# Patient Record
Sex: MALE | Race: WHITE | Employment: OTHER | URBAN - METROPOLITAN AREA
[De-identification: names, ages, dates, MRNs, and addresses within clinical notes are randomized per-mention and may not be internally consistent; named-entity substitution may affect disease eponyms.]

---

## 2023-10-02 ENCOUNTER — HOSPITAL ENCOUNTER (INPATIENT)
Age: 88
LOS: 3 days | Discharge: HOME | DRG: 381 | End: 2023-10-06
Attending: EMERGENCY MEDICINE | Admitting: INTERNAL MEDICINE
Payer: OTHER GOVERNMENT

## 2023-10-02 DIAGNOSIS — N18.31 STAGE 3A CHRONIC KIDNEY DISEASE (HCC): ICD-10-CM

## 2023-10-02 DIAGNOSIS — R13.10 DYSPHAGIA, UNSPECIFIED TYPE: Primary | ICD-10-CM

## 2023-10-02 DIAGNOSIS — R13.19 ESOPHAGEAL DYSPHAGIA: ICD-10-CM

## 2023-10-02 DIAGNOSIS — D64.9 CHRONIC ANEMIA: ICD-10-CM

## 2023-10-02 LAB
ALBUMIN SERPL-MCNC: 3.6 GM/DL (ref 3.4–5)
ALP BLD-CCNC: 88 IU/L (ref 40–128)
ALT SERPL-CCNC: 7 U/L (ref 10–40)
ANION GAP SERPL CALCULATED.3IONS-SCNC: 15 MMOL/L (ref 4–16)
AST SERPL-CCNC: 9 IU/L (ref 15–37)
BASOPHILS ABSOLUTE: 0.1 K/CU MM
BASOPHILS RELATIVE PERCENT: 0.9 % (ref 0–1)
BILIRUB SERPL-MCNC: 0.6 MG/DL (ref 0–1)
BUN SERPL-MCNC: 28 MG/DL (ref 6–23)
CALCIUM SERPL-MCNC: 8.8 MG/DL (ref 8.3–10.6)
CHLORIDE BLD-SCNC: 101 MMOL/L (ref 99–110)
CO2: 22 MMOL/L (ref 21–32)
CREAT SERPL-MCNC: 1.5 MG/DL (ref 0.9–1.3)
DIFFERENTIAL TYPE: ABNORMAL
EOSINOPHILS ABSOLUTE: 0.3 K/CU MM
EOSINOPHILS RELATIVE PERCENT: 3.4 % (ref 0–3)
GFR SERPL CREATININE-BSD FRML MDRD: 45 ML/MIN/1.73M2
GLUCOSE BLD-MCNC: 174 MG/DL (ref 70–99)
GLUCOSE SERPL-MCNC: 183 MG/DL (ref 70–99)
HCT VFR BLD CALC: 29.5 % (ref 42–52)
HEMOGLOBIN: 9.5 GM/DL (ref 13.5–18)
IMMATURE NEUTROPHIL %: 0.3 % (ref 0–0.43)
LYMPHOCYTES ABSOLUTE: 2.9 K/CU MM
LYMPHOCYTES RELATIVE PERCENT: 37.4 % (ref 24–44)
MCH RBC QN AUTO: 31.4 PG (ref 27–31)
MCHC RBC AUTO-ENTMCNC: 32.2 % (ref 32–36)
MCV RBC AUTO: 97.4 FL (ref 78–100)
MONOCYTES ABSOLUTE: 0.7 K/CU MM
MONOCYTES RELATIVE PERCENT: 8.6 % (ref 0–4)
NUCLEATED RBC %: 0 %
PDW BLD-RTO: 16.4 % (ref 11.7–14.9)
PLATELET # BLD: 228 K/CU MM (ref 140–440)
PMV BLD AUTO: 10.2 FL (ref 7.5–11.1)
POTASSIUM SERPL-SCNC: 4.4 MMOL/L (ref 3.5–5.1)
RBC # BLD: 3.03 M/CU MM (ref 4.6–6.2)
SEGMENTED NEUTROPHILS ABSOLUTE COUNT: 3.9 K/CU MM
SEGMENTED NEUTROPHILS RELATIVE PERCENT: 49.4 % (ref 36–66)
SODIUM BLD-SCNC: 138 MMOL/L (ref 135–145)
TOTAL IMMATURE NEUTOROPHIL: 0.02 K/CU MM
TOTAL NUCLEATED RBC: 0 K/CU MM
TOTAL PROTEIN: 6.7 GM/DL (ref 6.4–8.2)
WBC # BLD: 7.9 K/CU MM (ref 4–10.5)

## 2023-10-02 PROCEDURE — 99285 EMERGENCY DEPT VISIT HI MDM: CPT

## 2023-10-02 PROCEDURE — 93005 ELECTROCARDIOGRAM TRACING: CPT | Performed by: INTERNAL MEDICINE

## 2023-10-02 PROCEDURE — 82962 GLUCOSE BLOOD TEST: CPT

## 2023-10-02 PROCEDURE — 80053 COMPREHEN METABOLIC PANEL: CPT

## 2023-10-02 PROCEDURE — 85025 COMPLETE CBC W/AUTO DIFF WBC: CPT

## 2023-10-02 ASSESSMENT — PAIN - FUNCTIONAL ASSESSMENT: PAIN_FUNCTIONAL_ASSESSMENT: NONE - DENIES PAIN

## 2023-10-02 NOTE — ED PROVIDER NOTES
EKG:   -Atrial fibrillation  - Ventricular rate 78  - NV interval *  - QRS duration 68  - QT//403  - No STEMI  - No, Q-T prolongation, WPW, Q waves suggestive of HOCM, V-tach/V-fib, A-fib, A-flutter, SVT, torsardes or Brugada.      EKG interpreted by me in the absence of a cardiologist       Gemma, [address], DO  10/06/23 132

## 2023-10-03 ENCOUNTER — ANESTHESIA EVENT (OUTPATIENT)
Dept: ENDOSCOPY | Age: 88
End: 2023-10-03
Payer: OTHER GOVERNMENT

## 2023-10-03 ENCOUNTER — APPOINTMENT (OUTPATIENT)
Dept: CT IMAGING | Age: 88
DRG: 381 | End: 2023-10-03
Payer: OTHER GOVERNMENT

## 2023-10-03 PROBLEM — R13.10 DYSPHAGIA: Status: ACTIVE | Noted: 2023-10-03

## 2023-10-03 LAB
GLUCOSE BLD-MCNC: 127 MG/DL (ref 70–99)
GLUCOSE BLD-MCNC: 131 MG/DL (ref 70–99)
GLUCOSE BLD-MCNC: 133 MG/DL (ref 70–99)
GLUCOSE BLD-MCNC: 138 MG/DL (ref 70–99)

## 2023-10-03 PROCEDURE — 2580000003 HC RX 258: Performed by: FAMILY MEDICINE

## 2023-10-03 PROCEDURE — 6360000002 HC RX W HCPCS: Performed by: EMERGENCY MEDICINE

## 2023-10-03 PROCEDURE — 6360000002 HC RX W HCPCS: Performed by: INTERNAL MEDICINE

## 2023-10-03 PROCEDURE — A4216 STERILE WATER/SALINE, 10 ML: HCPCS | Performed by: LICENSED PRACTICAL NURSE

## 2023-10-03 PROCEDURE — C9113 INJ PANTOPRAZOLE SODIUM, VIA: HCPCS | Performed by: EMERGENCY MEDICINE

## 2023-10-03 PROCEDURE — 71250 CT THORAX DX C-: CPT

## 2023-10-03 PROCEDURE — C9113 INJ PANTOPRAZOLE SODIUM, VIA: HCPCS | Performed by: LICENSED PRACTICAL NURSE

## 2023-10-03 PROCEDURE — 6360000002 HC RX W HCPCS: Performed by: LICENSED PRACTICAL NURSE

## 2023-10-03 PROCEDURE — 94761 N-INVAS EAR/PLS OXIMETRY MLT: CPT

## 2023-10-03 PROCEDURE — 2580000003 HC RX 258: Performed by: INTERNAL MEDICINE

## 2023-10-03 PROCEDURE — 1200000000 HC SEMI PRIVATE

## 2023-10-03 PROCEDURE — 82962 GLUCOSE BLOOD TEST: CPT

## 2023-10-03 PROCEDURE — 96374 THER/PROPH/DIAG INJ IV PUSH: CPT

## 2023-10-03 PROCEDURE — 93005 ELECTROCARDIOGRAM TRACING: CPT | Performed by: INTERNAL MEDICINE

## 2023-10-03 PROCEDURE — 2580000003 HC RX 258: Performed by: LICENSED PRACTICAL NURSE

## 2023-10-03 RX ORDER — SODIUM CHLORIDE 0.9 % (FLUSH) 0.9 %
5-40 SYRINGE (ML) INJECTION EVERY 12 HOURS SCHEDULED
Status: DISCONTINUED | OUTPATIENT
Start: 2023-10-03 | End: 2023-10-06 | Stop reason: HOSPADM

## 2023-10-03 RX ORDER — INSULIN LISPRO 100 [IU]/ML
0-4 INJECTION, SOLUTION INTRAVENOUS; SUBCUTANEOUS NIGHTLY
Status: DISCONTINUED | OUTPATIENT
Start: 2023-10-03 | End: 2023-10-06 | Stop reason: HOSPADM

## 2023-10-03 RX ORDER — ENOXAPARIN SODIUM 100 MG/ML
40 INJECTION SUBCUTANEOUS DAILY
Status: DISCONTINUED | OUTPATIENT
Start: 2023-10-03 | End: 2023-10-06 | Stop reason: HOSPADM

## 2023-10-03 RX ORDER — SODIUM CHLORIDE 9 MG/ML
INJECTION, SOLUTION INTRAVENOUS PRN
Status: DISCONTINUED | OUTPATIENT
Start: 2023-10-03 | End: 2023-10-06 | Stop reason: HOSPADM

## 2023-10-03 RX ORDER — ACETAMINOPHEN 650 MG/1
650 SUPPOSITORY RECTAL EVERY 6 HOURS PRN
Status: DISCONTINUED | OUTPATIENT
Start: 2023-10-03 | End: 2023-10-06 | Stop reason: HOSPADM

## 2023-10-03 RX ORDER — POLYETHYLENE GLYCOL 3350 17 G/17G
17 POWDER, FOR SOLUTION ORAL DAILY PRN
Status: DISCONTINUED | OUTPATIENT
Start: 2023-10-03 | End: 2023-10-06 | Stop reason: HOSPADM

## 2023-10-03 RX ORDER — INSULIN LISPRO 100 [IU]/ML
0-8 INJECTION, SOLUTION INTRAVENOUS; SUBCUTANEOUS
Status: DISCONTINUED | OUTPATIENT
Start: 2023-10-03 | End: 2023-10-06 | Stop reason: HOSPADM

## 2023-10-03 RX ORDER — ACETAMINOPHEN 325 MG/1
650 TABLET ORAL EVERY 6 HOURS PRN
Status: DISCONTINUED | OUTPATIENT
Start: 2023-10-03 | End: 2023-10-06 | Stop reason: HOSPADM

## 2023-10-03 RX ORDER — SODIUM CHLORIDE 0.9 % (FLUSH) 0.9 %
5-40 SYRINGE (ML) INJECTION PRN
Status: DISCONTINUED | OUTPATIENT
Start: 2023-10-03 | End: 2023-10-06 | Stop reason: HOSPADM

## 2023-10-03 RX ORDER — SODIUM CHLORIDE 9 MG/ML
INJECTION, SOLUTION INTRAVENOUS CONTINUOUS
Status: DISPENSED | OUTPATIENT
Start: 2023-10-03 | End: 2023-10-03

## 2023-10-03 RX ORDER — PANTOPRAZOLE SODIUM 40 MG/10ML
40 INJECTION, POWDER, LYOPHILIZED, FOR SOLUTION INTRAVENOUS ONCE
Status: COMPLETED | OUTPATIENT
Start: 2023-10-03 | End: 2023-10-03

## 2023-10-03 RX ORDER — ONDANSETRON 4 MG/1
4 TABLET, ORALLY DISINTEGRATING ORAL EVERY 8 HOURS PRN
Status: DISCONTINUED | OUTPATIENT
Start: 2023-10-03 | End: 2023-10-06 | Stop reason: HOSPADM

## 2023-10-03 RX ORDER — DEXTROSE MONOHYDRATE 100 MG/ML
INJECTION, SOLUTION INTRAVENOUS CONTINUOUS PRN
Status: DISCONTINUED | OUTPATIENT
Start: 2023-10-03 | End: 2023-10-06 | Stop reason: HOSPADM

## 2023-10-03 RX ORDER — GLUCAGON 1 MG/ML
1 KIT INJECTION PRN
Status: DISCONTINUED | OUTPATIENT
Start: 2023-10-03 | End: 2023-10-06 | Stop reason: HOSPADM

## 2023-10-03 RX ORDER — SODIUM CHLORIDE 9 MG/ML
INJECTION, SOLUTION INTRAVENOUS CONTINUOUS
Status: DISPENSED | OUTPATIENT
Start: 2023-10-03 | End: 2023-10-04

## 2023-10-03 RX ORDER — ONDANSETRON 2 MG/ML
4 INJECTION INTRAMUSCULAR; INTRAVENOUS EVERY 6 HOURS PRN
Status: DISCONTINUED | OUTPATIENT
Start: 2023-10-03 | End: 2023-10-06 | Stop reason: HOSPADM

## 2023-10-03 RX ADMIN — PANTOPRAZOLE SODIUM 40 MG: 40 INJECTION, POWDER, FOR SOLUTION INTRAVENOUS at 02:36

## 2023-10-03 RX ADMIN — SODIUM CHLORIDE, PRESERVATIVE FREE 5 ML: 5 INJECTION INTRAVENOUS at 20:33

## 2023-10-03 RX ADMIN — PANTOPRAZOLE SODIUM 40 MG: 40 INJECTION, POWDER, FOR SOLUTION INTRAVENOUS at 17:59

## 2023-10-03 RX ADMIN — SODIUM CHLORIDE, PRESERVATIVE FREE 10 ML: 5 INJECTION INTRAVENOUS at 09:02

## 2023-10-03 RX ADMIN — ENOXAPARIN SODIUM 40 MG: 100 INJECTION SUBCUTANEOUS at 09:02

## 2023-10-03 RX ADMIN — SODIUM CHLORIDE: 9 INJECTION, SOLUTION INTRAVENOUS at 22:08

## 2023-10-03 NOTE — ED NOTES
ED TO INPATIENT SBAR HANDOFF    Patient Name: Hugo Erwin   :  1935  82 y.o. Preferred Name  Goldy Gusman  Family/Caregiver Present no   Restraints no   C-SSRS: Risk of Suicide: No Risk  Sitter no   Sepsis Risk Score Sepsis Risk Score: 0.9      Situation  Chief Complaint   Patient presents with    Dysphagia     Pt states has been having trouble swallowing for 3 months. Weight Loss     Brief Description of Patient's Condition: Patient came into the ER today for weight loss and has been having trouble swallowing. Awaiting for GI to possibly do a scope. Mental Status: oriented, alert, and coherent  Arrived from: home    Imaging:   CT CHEST WO CONTRAST    (Results Pending)     Abnormal labs:   Abnormal Labs Reviewed   CBC WITH AUTO DIFFERENTIAL - Abnormal; Notable for the following components:       Result Value    RBC 3.03 (*)     Hemoglobin 9.5 (*)     Hematocrit 29.5 (*)     MCH 31.4 (*)     RDW 16.4 (*)     Monocytes % 8.6 (*)     Eosinophils % 3.4 (*)     All other components within normal limits   COMPREHENSIVE METABOLIC PANEL W/ REFLEX TO MG FOR LOW K - Abnormal; Notable for the following components:    Glucose 183 (*)     BUN 28 (*)     Creatinine 1.5 (*)     Est, Glom Filt Rate 45 (*)     ALT 7 (*)     AST 9 (*)     All other components within normal limits   POCT GLUCOSE - Abnormal; Notable for the following components:    POC Glucose 174 (*)     All other components within normal limits   POCT GLUCOSE - Abnormal; Notable for the following components:    POC Glucose 138 (*)     All other components within normal limits   POCT GLUCOSE - Abnormal; Notable for the following components:    POC Glucose 127 (*)     All other components within normal limits   POCT GLUCOSE - Abnormal; Notable for the following components:    POC Glucose 133 (*)     All other components within normal limits       Background  History: No past medical history on file.     Assessment    Vitals/MEWS:    Level of Consciousness:

## 2023-10-03 NOTE — ED NOTES
[id number] perfect serve message sent to Dr Solo Warner on in patient consult from hospitalist.    [id number] Dr Solo Warner acknowledged perfect serve message.  Added to treatment team     Leonardo Moreau  10/03/23 3738

## 2023-10-03 NOTE — ED PROVIDER NOTES
[address] ENCOUNTER      Pt Name: Bam Blanca  MRN: [id number]  [facility] 1935  Date of evaluation: 10/2/2023  Provider: Cailin Breaux MD    CHIEF COMPLAINT       Chief Complaint   Patient presents with    Dysphagia     Pt states has been having trouble swallowing for 3 months. Weight Loss         HISTORY OF PRESENT ILLNESS      Bam Blanca is a 82 y.o. male who presents to the emergency department  for   Chief Complaint   Patient presents with    Dysphagia     Pt states has been having trouble swallowing for 3 months. Weight Loss       53-year-old male presents complaining of throat pain, dysphagia as well as weight loss. He is recently moved to [address] [place] [address]. He is a bit of a difficult historian. I was able to obtain collateral information from his daughter by phone. He reports that he was in [place] in July and developed respiratory failure and was hospitalized for a prolonged period. According to his daughter, it was believed that he had been aspirating. She reports that he initially had very high oxygen requirements and was on hospice for a while. He had some extensive work-up including some GI work-up in [place] she reports that showed some erosive gastritis as well as esophageal disease. He is on Protonix. She reports that his respiratory status did improve over time and that he has moved to [address] [place] [address] because it is at lower elevation. At this time he only occasionally requires supplemental oxygen. According to the patient as well as his daughter, he is having difficulty particularly with solid foods. He reports that food gets stuck for hours. He can successfully eat liquid or very soft foods. He endorses a weight loss of about 45 pounds recently. He is not having any breathing difficulties. He denies any fever, chills or other constitutional infectious symptoms. No abdominal pain, nausea or vomiting.

## 2023-10-03 NOTE — H&P
History and Physical      Name:  Amelia Reid /Age/Sex: 1935  (82 y.o. male)   MRN & CSN:  [id number] & [id number] Encounter Date/Time: 10/3/2023 2:47 AM EDT   Location:  ED30/ED-30 PCP: No primary care provider on file. Hospital Day: 2    Assessment and Plan:     #. Dysphagia  -Patient reports progressive dysphagia to solid, currently to soft foods. Patient can tolerate liquid diet.  -Keep patient n.p.o.  -GI consulted from ED  -Aspiration precautions. #.  Diabetes mellitus type 2, on long-term insulin  -As per care everywhere patient is on aspart, Lantus  -A1c-7.7-2023  -Patient reports that he usually takes sliding scale, not clear about Lantus  -Continue insulin sliding scale with hypoglycemia protocol. #.  CKD stage III  -Renal function at baseline.  -Creatinine 1.    #. Chronic anemia  -Hemoglobin 9.5 today, was 10.1-2023  -Monitor with repeat H&H    #. Chronic respiratory failure on home oxygen 2.5 L as needed. Disposition:   Current Living situation: home    Diet Keep n.p.o.   DVT Prophylaxis [x] Lovenox, []  Heparin, [] SCDs, [] Ambulation,  [] Eliquis, [] Xarelto   Code Status FULL   Surrogate Decision Maker/ POA      History from:   EMR, patient. History of Present Illness:     Chief Complaint: Dysphagia  Amelia Reid is a 82 y.o. male with diabetes mellitus type 2, CKD stage III, chronic anemia presented to ED for complaints of difficulty swallowing. Patient reports that he has been having difficulty swallowing for few months, which is progressively getting worse. Patient also reports losing 45 pounds in the last 3 months. Patient states that he is actually from [place], was with his daughter and [address] where she was working. They recently moved to [address]. No family is at bedside. ED physician discussed with patient's daughter.   Patient denying any chest pain, shortness of breath, denied any abdominal pain, denied any urinary

## 2023-10-03 NOTE — CONSULTS
[facility]             Evia Hooks, MD Jerral Manifold, MD Valentino Pedlar, APRN-CNP       Radha Clarke, APRN-CNP             [address] [facility] [address] [facility], [address] [facility]             [phone] fax [phone]         Reason for Consult:  Dysphagia    HISTORY OF PRESENT ILLNESS:                The patient is a 82 y.o. male with a past medical history S/P R knee replacement and a long hospital discourse prior to July 2023 in [address] [place] [address] where he had respiratory failure and required 16L of oxygen. There was an extensive GI workup done at that time. Was originally placed on hospice per family request. Resp status improved and he moved back to [place]. Per patient recently traveled from [address] [place] [address] and symptoms have been progressively worse. Was evaluated by a local hospital there and was discharged and treated for ulcers. Went to a second hospital during this trip and was discharged again for same thing. Has been home for 4 days. Patient reports when he was out to eat  2 days ago when he was eating some chicken he felt like it got stuck. He says that he is able to drink fluids but not solids. He reports having at least 6 of these episodes in the past. He reports for example vegetable soup the broth is fine but the vegetables are not. Reports weight loss of 45 pounds since June 9th. Denies having had difficulty swallowing this long. However, per daughter Bulmaro Hebert he was having swallowing difficulties during the long admission prior to July. Per daughter he had a swallow evaluation with imaging and EGD. Unable to see via care everwhere. Patient denies current abdominal pain, reflux, heartburn, Diarrhea, and constipation. Denies hematochezia, hematemesis, and melena stools He is positive for dysphagia, and vomiting. ROS: Per history of present illness. All other systems were reviewed and were negative. Allergies:  No Known Allergies.     Medications:

## 2023-10-04 ENCOUNTER — ANESTHESIA (OUTPATIENT)
Dept: ENDOSCOPY | Age: 88
End: 2023-10-04
Payer: OTHER GOVERNMENT

## 2023-10-04 PROBLEM — E43 SEVERE MALNUTRITION (HCC): Status: ACTIVE | Noted: 2023-10-04

## 2023-10-04 LAB
ANION GAP SERPL CALCULATED.3IONS-SCNC: 13 MMOL/L (ref 4–16)
BASOPHILS ABSOLUTE: 0.1 K/CU MM
BASOPHILS RELATIVE PERCENT: 0.9 % (ref 0–1)
BUN SERPL-MCNC: 23 MG/DL (ref 6–23)
CALCIUM SERPL-MCNC: 8.6 MG/DL (ref 8.3–10.6)
CHLORIDE BLD-SCNC: 105 MMOL/L (ref 99–110)
CO2: 23 MMOL/L (ref 21–32)
CREAT SERPL-MCNC: 1.4 MG/DL (ref 0.9–1.3)
DIFFERENTIAL TYPE: ABNORMAL
EKG ATRIAL RATE: 67 BPM
EKG ATRIAL RATE: 84 BPM
EKG DIAGNOSIS: NORMAL
EKG DIAGNOSIS: NORMAL
EKG P AXIS: 33 DEGREES
EKG P-R INTERVAL: 154 MS
EKG Q-T INTERVAL: 354 MS
EKG Q-T INTERVAL: 424 MS
EKG QRS DURATION: 68 MS
EKG QRS DURATION: 84 MS
EKG QTC CALCULATION (BAZETT): 403 MS
EKG QTC CALCULATION (BAZETT): 448 MS
EKG R AXIS: -14 DEGREES
EKG R AXIS: -18 DEGREES
EKG T AXIS: 0 DEGREES
EKG T AXIS: 9 DEGREES
EKG VENTRICULAR RATE: 67 BPM
EKG VENTRICULAR RATE: 78 BPM
EOSINOPHILS ABSOLUTE: 0.2 K/CU MM
EOSINOPHILS RELATIVE PERCENT: 3.6 % (ref 0–3)
GFR SERPL CREATININE-BSD FRML MDRD: 49 ML/MIN/1.73M2
GLUCOSE BLD-MCNC: 100 MG/DL (ref 70–99)
GLUCOSE BLD-MCNC: 104 MG/DL (ref 70–99)
GLUCOSE BLD-MCNC: 108 MG/DL (ref 70–99)
GLUCOSE BLD-MCNC: 223 MG/DL (ref 70–99)
GLUCOSE SERPL-MCNC: 109 MG/DL (ref 70–99)
HCT VFR BLD CALC: 29.8 % (ref 42–52)
HEMOGLOBIN: 9.5 GM/DL (ref 13.5–18)
IMMATURE NEUTROPHIL %: 0.3 % (ref 0–0.43)
LYMPHOCYTES ABSOLUTE: 2.2 K/CU MM
LYMPHOCYTES RELATIVE PERCENT: 34.3 % (ref 24–44)
MCH RBC QN AUTO: 31 PG (ref 27–31)
MCHC RBC AUTO-ENTMCNC: 31.9 % (ref 32–36)
MCV RBC AUTO: 97.4 FL (ref 78–100)
MONOCYTES ABSOLUTE: 0.5 K/CU MM
MONOCYTES RELATIVE PERCENT: 8.3 % (ref 0–4)
NUCLEATED RBC %: 0 %
PDW BLD-RTO: 15.9 % (ref 11.7–14.9)
PLATELET # BLD: 219 K/CU MM (ref 140–440)
PMV BLD AUTO: 10 FL (ref 7.5–11.1)
POTASSIUM SERPL-SCNC: 4.4 MMOL/L (ref 3.5–5.1)
RBC # BLD: 3.06 M/CU MM (ref 4.6–6.2)
SEGMENTED NEUTROPHILS ABSOLUTE COUNT: 3.3 K/CU MM
SEGMENTED NEUTROPHILS RELATIVE PERCENT: 52.6 % (ref 36–66)
SODIUM BLD-SCNC: 141 MMOL/L (ref 135–145)
TOTAL IMMATURE NEUTOROPHIL: 0.02 K/CU MM
TOTAL NUCLEATED RBC: 0 K/CU MM
WBC # BLD: 6.4 K/CU MM (ref 4–10.5)

## 2023-10-04 PROCEDURE — A4216 STERILE WATER/SALINE, 10 ML: HCPCS | Performed by: LICENSED PRACTICAL NURSE

## 2023-10-04 PROCEDURE — 94761 N-INVAS EAR/PLS OXIMETRY MLT: CPT

## 2023-10-04 PROCEDURE — 6360000002 HC RX W HCPCS: Performed by: NURSE ANESTHETIST, CERTIFIED REGISTERED

## 2023-10-04 PROCEDURE — 82962 GLUCOSE BLOOD TEST: CPT

## 2023-10-04 PROCEDURE — 0D728ZZ DILATION OF MIDDLE ESOPHAGUS, VIA NATURAL OR ARTIFICIAL OPENING ENDOSCOPIC: ICD-10-PCS | Performed by: INTERNAL MEDICINE

## 2023-10-04 PROCEDURE — 88305 TISSUE EXAM BY PATHOLOGIST: CPT | Performed by: PATHOLOGY

## 2023-10-04 PROCEDURE — 2580000003 HC RX 258: Performed by: LICENSED PRACTICAL NURSE

## 2023-10-04 PROCEDURE — 93010 ELECTROCARDIOGRAM REPORT: CPT | Performed by: INTERNAL MEDICINE

## 2023-10-04 PROCEDURE — C1726 CATH, BAL DIL, NON-VASCULAR: HCPCS | Performed by: INTERNAL MEDICINE

## 2023-10-04 PROCEDURE — 6360000002 HC RX W HCPCS: Performed by: INTERNAL MEDICINE

## 2023-10-04 PROCEDURE — 6360000002 HC RX W HCPCS: Performed by: LICENSED PRACTICAL NURSE

## 2023-10-04 PROCEDURE — 3700000000 HC ANESTHESIA ATTENDED CARE: Performed by: INTERNAL MEDICINE

## 2023-10-04 PROCEDURE — 2500000003 HC RX 250 WO HCPCS: Performed by: NURSE ANESTHETIST, CERTIFIED REGISTERED

## 2023-10-04 PROCEDURE — 80048 BASIC METABOLIC PNL TOTAL CA: CPT

## 2023-10-04 PROCEDURE — 3700000001 HC ADD 15 MINUTES (ANESTHESIA): Performed by: INTERNAL MEDICINE

## 2023-10-04 PROCEDURE — 3609017700 HC EGD DILATION GASTRIC/DUODENAL STRICTURE: Performed by: INTERNAL MEDICINE

## 2023-10-04 PROCEDURE — C9113 INJ PANTOPRAZOLE SODIUM, VIA: HCPCS | Performed by: LICENSED PRACTICAL NURSE

## 2023-10-04 PROCEDURE — 85025 COMPLETE CBC W/AUTO DIFF WBC: CPT

## 2023-10-04 PROCEDURE — 36415 COLL VENOUS BLD VENIPUNCTURE: CPT

## 2023-10-04 PROCEDURE — 1200000000 HC SEMI PRIVATE

## 2023-10-04 PROCEDURE — 2580000003 HC RX 258: Performed by: INTERNAL MEDICINE

## 2023-10-04 PROCEDURE — 0DB28ZX EXCISION OF MIDDLE ESOPHAGUS, VIA NATURAL OR ARTIFICIAL OPENING ENDOSCOPIC, DIAGNOSTIC: ICD-10-PCS | Performed by: INTERNAL MEDICINE

## 2023-10-04 PROCEDURE — 2709999900 HC NON-CHARGEABLE SUPPLY: Performed by: INTERNAL MEDICINE

## 2023-10-04 RX ORDER — LIDOCAINE HYDROCHLORIDE 20 MG/ML
INJECTION, SOLUTION EPIDURAL; INFILTRATION; INTRACAUDAL; PERINEURAL PRN
Status: DISCONTINUED | OUTPATIENT
Start: 2023-10-04 | End: 2023-10-04 | Stop reason: SDUPTHER

## 2023-10-04 RX ORDER — PROPOFOL 10 MG/ML
INJECTION, EMULSION INTRAVENOUS PRN
Status: DISCONTINUED | OUTPATIENT
Start: 2023-10-04 | End: 2023-10-04 | Stop reason: SDUPTHER

## 2023-10-04 RX ADMIN — LIDOCAINE HYDROCHLORIDE 5 ML: 20 INJECTION, SOLUTION EPIDURAL; INFILTRATION; INTRACAUDAL; PERINEURAL at 14:37

## 2023-10-04 RX ADMIN — PANTOPRAZOLE SODIUM 40 MG: 40 INJECTION, POWDER, FOR SOLUTION INTRAVENOUS at 09:09

## 2023-10-04 RX ADMIN — PANTOPRAZOLE SODIUM 40 MG: 40 INJECTION, POWDER, FOR SOLUTION INTRAVENOUS at 17:28

## 2023-10-04 RX ADMIN — PROPOFOL 25 MG: 10 INJECTION, EMULSION INTRAVENOUS at 14:45

## 2023-10-04 RX ADMIN — ONDANSETRON 4 MG: 2 INJECTION INTRAMUSCULAR; INTRAVENOUS at 23:43

## 2023-10-04 RX ADMIN — SODIUM CHLORIDE, PRESERVATIVE FREE 5 ML: 5 INJECTION INTRAVENOUS at 20:29

## 2023-10-04 RX ADMIN — LIDOCAINE HYDROCHLORIDE 5 ML: 20 INJECTION, SOLUTION EPIDURAL; INFILTRATION; INTRACAUDAL; PERINEURAL at 14:39

## 2023-10-04 RX ADMIN — PROPOFOL 25 MG: 10 INJECTION, EMULSION INTRAVENOUS at 14:37

## 2023-10-04 ASSESSMENT — PAIN - FUNCTIONAL ASSESSMENT: PAIN_FUNCTIONAL_ASSESSMENT: NONE - DENIES PAIN

## 2023-10-04 NOTE — PROGRESS NOTES
CHEST WO CONTRAST    Result Date: 10/3/2023  EXAMINATION: CT OF THE CHEST WITHOUT CONTRAST 10/3/2023 6:26 pm TECHNIQUE: CT of the chest was performed without the administration of intravenous contrast. Multiplanar reformatted images are provided for review. Automated exposure control, iterative reconstruction, and/or weight based adjustment of the mA/kV was utilized to reduce the radiation dose to as low as reasonably achievable. COMPARISON: None. HISTORY: ORDERING SYSTEM PROVIDED HISTORY: Dysphagia, rule out GE junction mass, esophageal neoplasm TECHNOLOGIST PROVIDED HISTORY: Reason for exam:->Dysphagia, rule out GE junction mass, esophageal neoplasm Reason for Exam: Dysphagia, rule out GE junction mass, esophageal neoplasm FINDINGS: Mediastinum: Nonspecific subcentimeter mediastinal lymph nodes. No significant pericardial fluid. Calcified coronary atheromatous plaque. The esophagus is decompressed. No inflammatory change identified. Lungs/pleura: Subpleural reticular opacities and mild bronchiolectasis in the lung bases. No consolidation. No evidence for edema. The central airway is patent. Upper Abdomen: No acute findings. Cholelithiasis. Soft Tissues/Bones: No acute findings. 1.  No esophageal obstructing process or acute inflammatory change. Recommend endoscopy if there remains suspicion for underlying neoplasm. 2.  Nonspecific fibrotic changes in the lung bases. 3.  Cholelithiasis.        CBC:   Recent Labs     10/02/23  1700 10/04/23  0748   WBC 7.9 6.4   HGB 9.5* 9.5*    219     BMP:    Recent Labs     10/02/23  1700 10/04/23  0748    141   K 4.4 4.4    105   CO2 22 23   BUN 28* 23   CREATININE 1.5* 1.4*   GLUCOSE 183* 109*     Hepatic:   Recent Labs     10/02/23  1700   AST 9*   ALT 7*   BILITOT 0.6   ALKPHOS 88     Lipids: No results found for: \"CHOL\", \"HDL\", \"TRIG\"  Hemoglobin A1C: No results found for: \"LABA1C\"  TSH: No results found for: \"TSH\"  Troponin: No results found

## 2023-10-04 NOTE — CONSULTS
CARDIOLOGY CONSULT NOTE   Reason for consultation:  preop    Referring physician:  Sarah Dominguez MD     Primary care physician: No primary care provider on file. Dear   Thanks for the consult. History of present illness:Stiven is a 82 y. o.year old who  presents with dysphagia and weight loss, he is a very poor historian all information obtained after review medical record and discussion with staff patient is scheduled for a EGD and cardiac consult is called for preop evaluation  Chief Complaint   Patient presents with    Dysphagia     Pt states has been having trouble swallowing for 3 months. Weight Loss     Blood pressure, cholesterol, blood glucose and weight are well controlled. Past medical history:    has no past medical history on file. Past surgical history:   has no past surgical history on file.   Social History:     Family history:   no family history of CAD, STROKE of DM    No Known Allergies    sodium chloride flush 0.9 % injection 5-40 mL, 2 times per day  sodium chloride flush 0.9 % injection 5-40 mL, PRN  0.9 % sodium chloride infusion, PRN  enoxaparin (LOVENOX) injection 40 mg, Daily  ondansetron (ZOFRAN-ODT) disintegrating tablet 4 mg, Q8H PRN   Or  ondansetron (ZOFRAN) injection 4 mg, Q6H PRN  polyethylene glycol (GLYCOLAX) packet 17 g, Daily PRN  acetaminophen (TYLENOL) tablet 650 mg, Q6H PRN   Or  acetaminophen (TYLENOL) suppository 650 mg, Q6H PRN  insulin lispro (HUMALOG) injection vial 0-8 Units, TID WC  insulin lispro (HUMALOG) injection vial 0-4 Units, Nightly  glucose chewable tablet 16 g, PRN  dextrose bolus 10% 125 mL, PRN   Or  dextrose bolus 10% 250 mL, PRN  glucagon injection 1 mg, PRN  dextrose 10 % infusion, Continuous PRN  pantoprazole (PROTONIX) 40 mg in sodium chloride (PF) 0.9 % 10 mL injection, BID      Current Facility-Administered Medications   Medication Dose Route Frequency Provider Last Rate Last Admin    sodium chloride flush 0.9 % injection 5-40 mL

## 2023-10-04 NOTE — PROGRESS NOTES
Comprehensive Nutrition Assessment    Type and Reason for Visit:  Initial, Positive Nutrition Screen (Unintentional Weight Loss and Poor Appetite)    Nutrition Recommendations/Plan:   Consult SLP for diet modification   Trial variety of high protein oral nutrition supplements during admission  May consider GI [place] diet if appropriate   Please document all PO intakes in I/O   Please obtain measured weights daily      Malnutrition Assessment:  Malnutrition Status:  Severe malnutrition (10/04/23 1368)    Context:  Acute Illness     Findings of the 6 clinical characteristics of malnutrition:  Energy Intake:  75% or less of estimated energy requirements for 7 or more days (3 months)  Weight Loss:  Unable to assess (reported 40# wt loss/3 mo)     Body Fat Loss:   (severe fat loss) Triceps, Orbital   Muscle Mass Loss: Moderate muscle mass loss Calf (gastrocnemius), Thigh (quadraceps), Scapula (trapezius)  Fluid Accumulation:  No significant fluid accumulation Extremities   Strength:  Not Performed    Nutrition Assessment:    Admitted with dysphagia, hx CKD, HLD, HTN, DMII, Esophageal Reflux, Sciatica per [facility]. Pt currently NPO for EGD at visit. Pt's last good meal on Saturday 10/1 per dtr who was on the phone. Spoke with pt and pt's dtr Francisca (over the phone). Pt's dtr provided most of diet recall/history. Dtr reports pt with dysphagia after 1 week from admission for respiratory failure on July 8th, has been losing weight since July. Reports 40# wt loss/3 months. Limited data in chart to verify. Pt's diet consistent of applesauce, yogurt, eggs, and on a good day will tolerate carlson crisp. States tolerates thin liquids. Pt drinks 1 protein shake daily (Mass Fit powder). Pt's dtr reports concerns with pt's hx of duodenal ulcers, esophagitis, and impaired epiglottis function per [facility] S[facility]. Recommend SLP evaluation prior to diet advancement. Performed NFPE, moderate to severe wasting noted.  Meets criteria for acute

## 2023-10-04 NOTE — PROGRESS NOTES
4 Eyes Skin Assessment     NAME:  Glenn Anthony  YOB: 1935  MEDICAL RECORD NUMBER:  [id number]    The patient is being assessed for  Admission    I agree that at least one RN has performed a thorough Head to Toe Skin Assessment on the patient. ALL assessment sites listed below have been assessed. Areas assessed by both nurses:    Head, Face, Ears, Shoulders, Back, Chest, Arms, Elbows, Hands, Sacrum. Buttock, Coccyx, Ischium, Legs. Feet and Heels, and Under Medical Devices          Does the Patient have a Wound?  No noted wound(s)       Gus Prevention initiated by RN: Yes  Wound Care Orders initiated by RN: No    Pressure Injury (Stage 3,4, Unstageable, DTI, NWPT, and Complex wounds) if present, place Wound referral order by RN under : No    New Ostomies, if present place, Ostomy referral order under : No     Nurse 1 eSignature: Electronically signed by Sheree Sethi RN on 10/4/23 at 5:54 AM EDT    **SHARE this note so that the co-signing nurse can place an eSignature**    Nurse 2 eSignature: Electronically signed by Nahomy Gunn LPN on 95/2/87 at 4:86 AM EDT

## 2023-10-04 NOTE — PROGRESS NOTES
Patient alert.   Patient transferred to [facility], room 4101 by bed with portable cardiac monitor and IV accompanied by Sheryl Riojas

## 2023-10-04 NOTE — PLAN OF CARE
Problem: Safety - Adult  Goal: Free from fall injury  Outcome: Progressing     Problem: ABCDS Injury Assessment  Goal: Absence of physical injury  Outcome: Progressing     Problem: Skin/Tissue Integrity  Goal: Absence of new skin breakdown  Description: 1. Monitor for areas of redness and/or skin breakdown  2. Assess vascular access sites hourly  3. Every 4-6 hours minimum:  Change oxygen saturation probe site  4. Every 4-6 hours:  If on nasal continuous positive airway pressure, respiratory therapy assess nares and determine need for appliance change or resting period.   Outcome: Progressing     Problem: Discharge Planning  Goal: Discharge to home or other facility with appropriate resources  Outcome: Progressing     Problem: Gastrointestinal - Adult  Goal: Maintains adequate nutritional intake  Outcome: Progressing

## 2023-10-05 ENCOUNTER — APPOINTMENT (OUTPATIENT)
Dept: GENERAL RADIOLOGY | Age: 88
DRG: 381 | End: 2023-10-05
Payer: OTHER GOVERNMENT

## 2023-10-05 ENCOUNTER — ANESTHESIA EVENT (OUTPATIENT)
Dept: ENDOSCOPY | Age: 88
End: 2023-10-05
Payer: OTHER GOVERNMENT

## 2023-10-05 LAB
GLUCOSE BLD-MCNC: 245 MG/DL (ref 70–99)
GLUCOSE BLD-MCNC: 284 MG/DL (ref 70–99)
GLUCOSE BLD-MCNC: 349 MG/DL (ref 70–99)
GLUCOSE BLD-MCNC: 358 MG/DL (ref 70–99)
GLUCOSE BLD-MCNC: 427 MG/DL (ref 70–99)

## 2023-10-05 PROCEDURE — 97535 SELF CARE MNGMENT TRAINING: CPT

## 2023-10-05 PROCEDURE — 97116 GAIT TRAINING THERAPY: CPT

## 2023-10-05 PROCEDURE — 2580000003 HC RX 258: Performed by: LICENSED PRACTICAL NURSE

## 2023-10-05 PROCEDURE — 6370000000 HC RX 637 (ALT 250 FOR IP): Performed by: INTERNAL MEDICINE

## 2023-10-05 PROCEDURE — 74220 X-RAY XM ESOPHAGUS 1CNTRST: CPT

## 2023-10-05 PROCEDURE — 1200000000 HC SEMI PRIVATE

## 2023-10-05 PROCEDURE — 97530 THERAPEUTIC ACTIVITIES: CPT

## 2023-10-05 PROCEDURE — 6360000004 HC RX CONTRAST MEDICATION: Performed by: INTERNAL MEDICINE

## 2023-10-05 PROCEDURE — 2580000003 HC RX 258: Performed by: INTERNAL MEDICINE

## 2023-10-05 PROCEDURE — 97162 PT EVAL MOD COMPLEX 30 MIN: CPT

## 2023-10-05 PROCEDURE — A4216 STERILE WATER/SALINE, 10 ML: HCPCS | Performed by: LICENSED PRACTICAL NURSE

## 2023-10-05 PROCEDURE — 94761 N-INVAS EAR/PLS OXIMETRY MLT: CPT

## 2023-10-05 PROCEDURE — 6360000002 HC RX W HCPCS: Performed by: INTERNAL MEDICINE

## 2023-10-05 PROCEDURE — C9113 INJ PANTOPRAZOLE SODIUM, VIA: HCPCS | Performed by: LICENSED PRACTICAL NURSE

## 2023-10-05 PROCEDURE — 93005 ELECTROCARDIOGRAM TRACING: CPT | Performed by: STUDENT IN AN ORGANIZED HEALTH CARE EDUCATION/TRAINING PROGRAM

## 2023-10-05 PROCEDURE — 6360000002 HC RX W HCPCS: Performed by: LICENSED PRACTICAL NURSE

## 2023-10-05 PROCEDURE — 6370000000 HC RX 637 (ALT 250 FOR IP): Performed by: FAMILY MEDICINE

## 2023-10-05 PROCEDURE — 97166 OT EVAL MOD COMPLEX 45 MIN: CPT

## 2023-10-05 PROCEDURE — 82962 GLUCOSE BLOOD TEST: CPT

## 2023-10-05 RX ORDER — INSULIN GLARGINE 100 [IU]/ML
15 INJECTION, SOLUTION SUBCUTANEOUS ONCE
Status: COMPLETED | OUTPATIENT
Start: 2023-10-05 | End: 2023-10-05

## 2023-10-05 RX ORDER — INSULIN LISPRO 100 [IU]/ML
8 INJECTION, SOLUTION INTRAVENOUS; SUBCUTANEOUS ONCE
Status: COMPLETED | OUTPATIENT
Start: 2023-10-05 | End: 2023-10-05

## 2023-10-05 RX ADMIN — INSULIN LISPRO 8 UNITS: 100 INJECTION, SOLUTION INTRAVENOUS; SUBCUTANEOUS at 16:24

## 2023-10-05 RX ADMIN — INSULIN LISPRO 2 UNITS: 100 INJECTION, SOLUTION INTRAVENOUS; SUBCUTANEOUS at 09:50

## 2023-10-05 RX ADMIN — SODIUM CHLORIDE, PRESERVATIVE FREE 5 ML: 5 INJECTION INTRAVENOUS at 20:31

## 2023-10-05 RX ADMIN — INSULIN GLARGINE 15 UNITS: 100 INJECTION, SOLUTION SUBCUTANEOUS at 21:30

## 2023-10-05 RX ADMIN — PANTOPRAZOLE SODIUM 40 MG: 40 INJECTION, POWDER, FOR SOLUTION INTRAVENOUS at 09:50

## 2023-10-05 RX ADMIN — INSULIN LISPRO 4 UNITS: 100 INJECTION, SOLUTION INTRAVENOUS; SUBCUTANEOUS at 21:30

## 2023-10-05 RX ADMIN — PANTOPRAZOLE SODIUM 40 MG: 40 INJECTION, POWDER, FOR SOLUTION INTRAVENOUS at 16:25

## 2023-10-05 RX ADMIN — ENOXAPARIN SODIUM 40 MG: 100 INJECTION SUBCUTANEOUS at 09:49

## 2023-10-05 RX ADMIN — INSULIN LISPRO 8 UNITS: 100 INJECTION, SOLUTION INTRAVENOUS; SUBCUTANEOUS at 21:32

## 2023-10-05 RX ADMIN — DIATRIZOATE MEGLUMINE AND DIATRIZOATE SODIUM 120 ML: 660; 100 LIQUID ORAL; RECTAL at 13:01

## 2023-10-05 RX ADMIN — SODIUM CHLORIDE, PRESERVATIVE FREE 10 ML: 5 INJECTION INTRAVENOUS at 09:59

## 2023-10-05 NOTE — ANESTHESIA POSTPROCEDURE EVALUATION
Department of Anesthesiology  Postprocedure Note    Patient: Violetta Saint  MRN: [id number]  YOB: 1935  Date of evaluation: 10/5/2023      Procedure Summary     Date: 10/04/23 Room / Location: [address] [place] [address]    Anesthesia Start: 6005 Anesthesia Stop: 9681    Procedure: EGD ESOPHAGOGASTRODUODENOSCOPY dilated with 6-8 mm balloon, dilated to 8 mm. Biopsy of esophageal stricture Diagnosis:       Dysphagia, unspecified type      (Dysphagia, unspecified type [R13.10])    Surgeons: Geraldine Anaya MD Responsible Provider: Jessiac Tompkins MD    Anesthesia Type: MAC ASA Status: 3          Anesthesia Type: No value filed.     Dionna Phase I: Dionna Score: 10    Dionna Phase II:        Anesthesia Post Evaluation    Patient location during evaluation: PACU  Patient participation: complete - patient participated  Level of consciousness: awake  Pain score: 1  Airway patency: patent  Nausea & Vomiting: no nausea  Complications: no  Cardiovascular status: hemodynamically stable  Respiratory status: nasal cannula  Hydration status: euvolemic  Multimodal analgesia pain management approach

## 2023-10-05 NOTE — PROGRESS NOTES
solids-stricture as evidence of upper GI on 10/4/2023 that required mild dilation will need repeat EGD for further dilation.   -Continue full liquid diet for now  -Continue Protonix twice daily-recommend several weeks of this with the plan to taper down to once daily  -Will need repeat EGD  -Per Dr. Jennifer Amaya an esophagram was ordered  2) Anemic- Hgb stable 9.5 likely due to poor intake but could be related to ulcer or anemia of chronic disease  -continue to monitor H/H  -Transfuse to keep hgb >7          Patient's history, exam, and plan of care were discussed with the patient, Adonis Hanks his daughter and Dr. Jennifer Amaya. All questions and concerns were discussed with the patient. Patient agreed to plan. Thank you for allowing us to be involved in the management of this patient. We will follow this patient along with you. Please feel free to call with any questions. SUDARSHAN Lowry - GABINO  Gastroenterology   10/5/2023   11:09 AM       Recent Labs     10/02/23  1700   AST 9*   ALT 7*   ALKPHOS 88   BILITOT 0.6      I have personally seen and examined the patient independently. I have reviewed the patient's available data,including medical history and recent test results. Reviewed and discussed note as documented by DARREN. I agree with the physical exam findings, assessment and plan. Briefly   Patient with dysphagia and noted to have benign esophageal stricture, endoscopic dilation up to 8 mm however unable to pass with the EGD scope. Currently dysphagia has improved since yesterday and patient has been able to tolerate liquid diet without any limitation.     Gen: normal mood, alert  ENT: normal TJ  Cardiovascular: RRR, No M/R/G  Respiratory: CTA BL  Gastrointestinal: Soft,NT ND  Genitourinary: no suprapubic tenderness  Musculoskeletal: no scars  Skin:moist  Neuro: alert and oriented x3    My assessment and plan reveals   #1 esophageal stricture noted in the midesophagus, esophagram noted stricture to be 3 cm in length. Plan for repeat balloon dilation. N.p.o. after midnight    #2 anemia  Ordered iron studies  Continue to trend hemoglobin    #3 GLADYS  Will defer to family medicine team      My documented MDM is a substantive portion of the supervisory visit. Comment: Please note this report has been produced using speech recognition software and may contain errors related to that system including errors in grammar, punctuation, and spelling, as well as words and phrases that may be inappropriate. If there are any questions or concerns please feel free to contact the dictating provider for clarification.

## 2023-10-05 NOTE — PROGRESS NOTES
10/05/23 1624   Encounter Summary   Encounter Overview/Reason  Crisis   Service Provided For: Patient and family together   Referral/Consult From: Nurse   Support System Children   Last Encounter  10/05/23   Complexity of Encounter Low   Begin Time 0402   End Time  0415   Total Time Calculated 13 min   Spiritual/Emotional needs   Type Spiritual Support   Assessment/Intervention/Outcome   Assessment Calm;Coping; Hopeful   Intervention Active listening;Discussed relationship with God;Nurtured Hope;Prayer (assurance of)/[place];Sustaining Presence/Ministry of presence   Outcome Comfort;Coping;Encouraged;Expressed Gratitude   Plan and Referrals   Plan/Referrals Continue to visit, (comment)

## 2023-10-05 NOTE — PROGRESS NOTES
Hospitalist - rapid response earlier this afternoon    Rapid response was called because patient's tele monitor rang an alarm re: ventricular fibrillation I was informed. Reviewed strips with ICU team - pt did not have ventricular fibrillation. One of the leads wasn't recording well - another lead clearly showed distinct QRS's during this episode. Notified attending.

## 2023-10-05 NOTE — CARE COORDINATION
CM PS to Dr Sienna Caicedo, received orders for PT/OT. CM placed orders and white board. Pt shared falls at home.  [facility]

## 2023-10-05 NOTE — PROGRESS NOTES
Occupational Therapy    [facility] ACUTE CARE OCCUPATIONAL THERAPY EVALUATION    Mary Benson, 1935, 4101/4101-A, 10/5/2023    Discharge Recommendation: Home with initial 24 hour supervision/assistance    History:  [place]:  The encounter diagnosis was Dysphagia, unspecified type. Subjective:  Patient states: \"I moved here from [place] with my daughter! \"   Pain: Pt denied pain this date  Communication with other providers: PT Rance Hodgkins, JUAN MANUEL Zhong  Restrictions: General Precautions, Fall Risk, Full liquid diet, Bed/chair alarm    Home Setup/Prior level of function:  Social/Functional History  Lives With: Daughter  Type of Home: House  Home Layout: Two level, 1/2 bath on main level (sleeps downstairs but has to go upstairs to shower)  Home Access:  (single rail)  Entrance Stairs - Number of Steps: 3  Bathroom Shower/Tub: Tub/Shower unit  Home Equipment: Delmas Gaucher, 4 wheeled  Has the patient had two or more falls in the past year or any fall with injury in the past year?: Yes  ADL Assistance: Independent  Ambulation Assistance: Independent (mod I with walker)  Transfer Assistance: Independent  Active : Yes    Examination:  Observation: Supine in bed upon arrival. Mild confusion but pleasant and agreeable to evaluation. Daughter arrived mid-session and supportive.   Vision: WFL  Hearing: Slightly [place] (has BL hearing aids)  Vitals: Stable vitals throughout session on room air    Body Systems and functions:  ROM: WFL all joints in BL UEs  Strength: 4 to 4+/5 MMT all major muscle groups BL UEs  Sensation: WFL (denies numbness/tingling)  Tone: Normal  Coordination: WFL for ADLs  Perception: WNL    Activities of Daily Living (ADLs):  Feeding: Supervision (full liquid diet)  Grooming: SBA (completed facial hygiene, grooming task of combing hair, and oral hygiene tasks of brushing/rinsing seated at sink on 4ww seat; min cues for safety with 4ww during task)  UB bathing: SBA  LB bathing: CGA (light dynamic Activities performed today included bed mobility training, sitting balance/tolerance, transfer training, household mobility with 4ww, education on role of OT, POC, importance of OOB activity, d/c planning and recommendations   Self Care Training:   Cues were given for safety, sequence, UE/LE placement, visual cues, and balance. Activities performed today included UB/LB dressing tasks, grooming, facial hygiene, oral hygiene routine at sink    Safety Measures: Gait belt used, Left in Chair, Alarm in place    Assessment:  Pt is an 82year old male with no past medical history on file. Pt admitted with dysphagia and underwent EGD with esophageal dilation on 10-4. Pt lives at home with his daughter and at baseline is independent with ADLs and ambulates mod I with a 4ww. Pt performed well this date, and from a self-care and mobility standpoint, is safe to return home at discharge with initial 24 hour support recommended. Complexity: Moderate  Prognosis: Good, Fair  Plan: 2+x/week    Goals:  1. Pt will complete all aspects of bed mobility for EOB/OOB ADLs mod I with HOB flat  2. Pt will complete UB/LB bathing with supervision  3. Pt will complete all aspects of LB dressing with supervision  4. Pt will complete all functional transfers to and from bed, chair, toilet, shower chair mod I with use of grab bars PRN  5. Pt will ambulate HH distance to bathroom for toileting mod I with 4ww  6. Pt will complete all aspects of toileting task with supervision  7.  Pt will complete oral hygiene/grooming routine in standing at sink independently     Time:   Time in: 1432  Time out: 1511  Timed treatment minutes: 24  Total time: 39    Electronically signed by:    Enolia Runner, OTR/MARIA LUISA, [facility], .[phone]

## 2023-10-05 NOTE — PROGRESS NOTES
Hospitalist notified via secure message patients blood sugar still elevated at 349  after 8 units of humalog at 1642. Patient did eat dinner.  No new orders at this time

## 2023-10-06 ENCOUNTER — ANESTHESIA (OUTPATIENT)
Dept: ENDOSCOPY | Age: 88
End: 2023-10-06
Payer: OTHER GOVERNMENT

## 2023-10-06 VITALS
OXYGEN SATURATION: 90 % | RESPIRATION RATE: 18 BRPM | SYSTOLIC BLOOD PRESSURE: 134 MMHG | TEMPERATURE: 97.8 F | HEIGHT: 68 IN | HEART RATE: 87 BPM | DIASTOLIC BLOOD PRESSURE: 46 MMHG | BODY MASS INDEX: 27.89 KG/M2 | WEIGHT: 184 LBS

## 2023-10-06 PROBLEM — D64.9 CHRONIC ANEMIA: Status: ACTIVE | Noted: 2023-10-06

## 2023-10-06 PROBLEM — K22.2 ESOPHAGEAL STRICTURE: Status: ACTIVE | Noted: 2023-10-06

## 2023-10-06 PROBLEM — K22.70 BARRETT'S ESOPHAGUS DETERMINED BY ENDOSCOPY: Status: ACTIVE | Noted: 2023-10-06

## 2023-10-06 PROBLEM — N18.9 CKD (CHRONIC KIDNEY DISEASE): Status: ACTIVE | Noted: 2023-10-06

## 2023-10-06 LAB
ANION GAP SERPL CALCULATED.3IONS-SCNC: 14 MMOL/L (ref 4–16)
BASOPHILS ABSOLUTE: 0.1 K/CU MM
BASOPHILS RELATIVE PERCENT: 0.6 % (ref 0–1)
BUN SERPL-MCNC: 22 MG/DL (ref 6–23)
CALCIUM SERPL-MCNC: 8.8 MG/DL (ref 8.3–10.6)
CHLORIDE BLD-SCNC: 104 MMOL/L (ref 99–110)
CO2: 22 MMOL/L (ref 21–32)
CREAT SERPL-MCNC: 1.4 MG/DL (ref 0.9–1.3)
DIFFERENTIAL TYPE: ABNORMAL
EOSINOPHILS ABSOLUTE: 0.2 K/CU MM
EOSINOPHILS RELATIVE PERCENT: 1.8 % (ref 0–3)
FERRITIN: 266 NG/ML (ref 30–400)
GFR SERPL CREATININE-BSD FRML MDRD: 49 ML/MIN/1.73M2
GLUCOSE BLD-MCNC: 131 MG/DL (ref 70–99)
GLUCOSE BLD-MCNC: 147 MG/DL (ref 70–99)
GLUCOSE BLD-MCNC: 220 MG/DL (ref 70–99)
GLUCOSE BLD-MCNC: 290 MG/DL (ref 70–99)
GLUCOSE BLD-MCNC: 85 MG/DL (ref 70–99)
GLUCOSE SERPL-MCNC: 162 MG/DL (ref 70–99)
HCT VFR BLD CALC: 30.8 % (ref 42–52)
HEMOGLOBIN: 9.8 GM/DL (ref 13.5–18)
IMMATURE NEUTROPHIL %: 0.4 % (ref 0–0.43)
IRON: 27 UG/DL (ref 59–158)
LYMPHOCYTES ABSOLUTE: 2.1 K/CU MM
LYMPHOCYTES RELATIVE PERCENT: 24.1 % (ref 24–44)
MCH RBC QN AUTO: 31.2 PG (ref 27–31)
MCHC RBC AUTO-ENTMCNC: 31.8 % (ref 32–36)
MCV RBC AUTO: 98.1 FL (ref 78–100)
MONOCYTES ABSOLUTE: 0.6 K/CU MM
MONOCYTES RELATIVE PERCENT: 6.9 % (ref 0–4)
NUCLEATED RBC %: 0 %
PCT TRANSFERRIN: 16 % (ref 10–44)
PDW BLD-RTO: 16.2 % (ref 11.7–14.9)
PLATELET # BLD: 216 K/CU MM (ref 140–440)
PMV BLD AUTO: 10.6 FL (ref 7.5–11.1)
POTASSIUM SERPL-SCNC: 4.2 MMOL/L (ref 3.5–5.1)
RBC # BLD: 3.14 M/CU MM (ref 4.6–6.2)
SEGMENTED NEUTROPHILS ABSOLUTE COUNT: 5.9 K/CU MM
SEGMENTED NEUTROPHILS RELATIVE PERCENT: 66.2 % (ref 36–66)
SODIUM BLD-SCNC: 140 MMOL/L (ref 135–145)
TOTAL IMMATURE NEUTOROPHIL: 0.04 K/CU MM
TOTAL IRON BINDING CAPACITY: 168 UG/DL (ref 250–450)
TOTAL NUCLEATED RBC: 0 K/CU MM
UNSATURATED IRON BINDING CAPACITY: 141 UG/DL (ref 110–370)
WBC # BLD: 8.9 K/CU MM (ref 4–10.5)

## 2023-10-06 PROCEDURE — 80048 BASIC METABOLIC PNL TOTAL CA: CPT

## 2023-10-06 PROCEDURE — 84300 ASSAY OF URINE SODIUM: CPT

## 2023-10-06 PROCEDURE — 97530 THERAPEUTIC ACTIVITIES: CPT

## 2023-10-06 PROCEDURE — 82570 ASSAY OF URINE CREATININE: CPT

## 2023-10-06 PROCEDURE — 97116 GAIT TRAINING THERAPY: CPT

## 2023-10-06 PROCEDURE — 85025 COMPLETE CBC W/AUTO DIFF WBC: CPT

## 2023-10-06 PROCEDURE — 81003 URINALYSIS AUTO W/O SCOPE: CPT

## 2023-10-06 PROCEDURE — C1726 CATH, BAL DIL, NON-VASCULAR: HCPCS | Performed by: INTERNAL MEDICINE

## 2023-10-06 PROCEDURE — 2580000003 HC RX 258: Performed by: LICENSED PRACTICAL NURSE

## 2023-10-06 PROCEDURE — 6360000002 HC RX W HCPCS

## 2023-10-06 PROCEDURE — 6360000002 HC RX W HCPCS: Performed by: LICENSED PRACTICAL NURSE

## 2023-10-06 PROCEDURE — 6370000000 HC RX 637 (ALT 250 FOR IP): Performed by: NURSE PRACTITIONER

## 2023-10-06 PROCEDURE — 84156 ASSAY OF PROTEIN URINE: CPT

## 2023-10-06 PROCEDURE — 3700000000 HC ANESTHESIA ATTENDED CARE: Performed by: INTERNAL MEDICINE

## 2023-10-06 PROCEDURE — 3700000001 HC ADD 15 MINUTES (ANESTHESIA): Performed by: INTERNAL MEDICINE

## 2023-10-06 PROCEDURE — 2709999900 HC NON-CHARGEABLE SUPPLY: Performed by: INTERNAL MEDICINE

## 2023-10-06 PROCEDURE — 82962 GLUCOSE BLOOD TEST: CPT

## 2023-10-06 PROCEDURE — 2580000003 HC RX 258: Performed by: INTERNAL MEDICINE

## 2023-10-06 PROCEDURE — 83550 IRON BINDING TEST: CPT

## 2023-10-06 PROCEDURE — 0DB28ZX EXCISION OF MIDDLE ESOPHAGUS, VIA NATURAL OR ARTIFICIAL OPENING ENDOSCOPIC, DIAGNOSTIC: ICD-10-PCS | Performed by: INTERNAL MEDICINE

## 2023-10-06 PROCEDURE — 2580000003 HC RX 258: Performed by: FAMILY MEDICINE

## 2023-10-06 PROCEDURE — 83540 ASSAY OF IRON: CPT

## 2023-10-06 PROCEDURE — 97535 SELF CARE MNGMENT TRAINING: CPT

## 2023-10-06 PROCEDURE — 88305 TISSUE EXAM BY PATHOLOGIST: CPT

## 2023-10-06 PROCEDURE — 6370000000 HC RX 637 (ALT 250 FOR IP): Performed by: INTERNAL MEDICINE

## 2023-10-06 PROCEDURE — 83935 ASSAY OF URINE OSMOLALITY: CPT

## 2023-10-06 PROCEDURE — 36415 COLL VENOUS BLD VENIPUNCTURE: CPT

## 2023-10-06 PROCEDURE — 82728 ASSAY OF FERRITIN: CPT

## 2023-10-06 PROCEDURE — 0D728ZZ DILATION OF MIDDLE ESOPHAGUS, VIA NATURAL OR ARTIFICIAL OPENING ENDOSCOPIC: ICD-10-PCS | Performed by: INTERNAL MEDICINE

## 2023-10-06 PROCEDURE — C9113 INJ PANTOPRAZOLE SODIUM, VIA: HCPCS | Performed by: LICENSED PRACTICAL NURSE

## 2023-10-06 PROCEDURE — 3609017700 HC EGD DILATION GASTRIC/DUODENAL STRICTURE: Performed by: INTERNAL MEDICINE

## 2023-10-06 PROCEDURE — A4216 STERILE WATER/SALINE, 10 ML: HCPCS | Performed by: LICENSED PRACTICAL NURSE

## 2023-10-06 RX ORDER — LIDOCAINE HYDROCHLORIDE 20 MG/ML
INJECTION, SOLUTION INTRAVENOUS PRN
Status: DISCONTINUED | OUTPATIENT
Start: 2023-10-06 | End: 2023-10-06 | Stop reason: SDUPTHER

## 2023-10-06 RX ORDER — SODIUM CHLORIDE, SODIUM LACTATE, POTASSIUM CHLORIDE, CALCIUM CHLORIDE 600; 310; 30; 20 MG/100ML; MG/100ML; MG/100ML; MG/100ML
INJECTION, SOLUTION INTRAVENOUS CONTINUOUS
Status: ACTIVE | OUTPATIENT
Start: 2023-10-06 | End: 2023-10-06

## 2023-10-06 RX ORDER — INSULIN GLARGINE-YFGN 100 [IU]/ML
INJECTION, SOLUTION SUBCUTANEOUS
COMMUNITY
Start: 2023-09-26

## 2023-10-06 RX ORDER — INSULIN GLARGINE 100 [IU]/ML
5 INJECTION, SOLUTION SUBCUTANEOUS DAILY
Status: DISCONTINUED | OUTPATIENT
Start: 2023-10-06 | End: 2023-10-06 | Stop reason: HOSPADM

## 2023-10-06 RX ORDER — PANTOPRAZOLE SODIUM 40 MG/1
40 TABLET, DELAYED RELEASE ORAL
Qty: 60 TABLET | Refills: 0 | Status: SHIPPED | OUTPATIENT
Start: 2023-10-06

## 2023-10-06 RX ORDER — PENTOXIFYLLINE 400 MG/1
1 TABLET, EXTENDED RELEASE ORAL 2 TIMES DAILY
COMMUNITY
Start: 2022-09-01

## 2023-10-06 RX ORDER — ESOMEPRAZOLE MAGNESIUM 40 MG/1
FOR SUSPENSION ORAL
Status: ON HOLD | COMMUNITY
Start: 2023-09-01 | End: 2023-10-06 | Stop reason: HOSPADM

## 2023-10-06 RX ORDER — SIMVASTATIN 10 MG
10 TABLET ORAL
COMMUNITY
Start: 2023-03-08

## 2023-10-06 RX ORDER — INSULIN GLARGINE-YFGN 100 [IU]/ML
INJECTION, SOLUTION SUBCUTANEOUS
COMMUNITY
Start: 2023-03-08

## 2023-10-06 RX ORDER — TAMSULOSIN HYDROCHLORIDE 0.4 MG/1
0.4 CAPSULE ORAL
COMMUNITY
Start: 2023-03-08

## 2023-10-06 RX ORDER — PROPOFOL 10 MG/ML
INJECTION, EMULSION INTRAVENOUS PRN
Status: DISCONTINUED | OUTPATIENT
Start: 2023-10-06 | End: 2023-10-06 | Stop reason: SDUPTHER

## 2023-10-06 RX ADMIN — INSULIN LISPRO 4 UNITS: 100 INJECTION, SOLUTION INTRAVENOUS; SUBCUTANEOUS at 16:19

## 2023-10-06 RX ADMIN — INSULIN GLARGINE 5 UNITS: 100 INJECTION, SOLUTION SUBCUTANEOUS at 10:50

## 2023-10-06 RX ADMIN — PROPOFOL 190 MG: 10 INJECTION, EMULSION INTRAVENOUS at 08:38

## 2023-10-06 RX ADMIN — SODIUM CHLORIDE, POTASSIUM CHLORIDE, SODIUM LACTATE AND CALCIUM CHLORIDE: 600; 310; 30; 20 INJECTION, SOLUTION INTRAVENOUS at 02:34

## 2023-10-06 RX ADMIN — SODIUM CHLORIDE, POTASSIUM CHLORIDE, SODIUM LACTATE AND CALCIUM CHLORIDE: 600; 310; 30; 20 INJECTION, SOLUTION INTRAVENOUS at 08:29

## 2023-10-06 RX ADMIN — PANTOPRAZOLE SODIUM 40 MG: 40 INJECTION, POWDER, FOR SOLUTION INTRAVENOUS at 10:51

## 2023-10-06 RX ADMIN — PANTOPRAZOLE SODIUM 40 MG: 40 INJECTION, POWDER, FOR SOLUTION INTRAVENOUS at 16:20

## 2023-10-06 RX ADMIN — SODIUM CHLORIDE, PRESERVATIVE FREE 10 ML: 5 INJECTION INTRAVENOUS at 10:51

## 2023-10-06 RX ADMIN — LIDOCAINE HYDROCHLORIDE 60 MG: 20 INJECTION, SOLUTION INTRAVENOUS at 08:38

## 2023-10-06 ASSESSMENT — PAIN - FUNCTIONAL ASSESSMENT: PAIN_FUNCTIONAL_ASSESSMENT: 0-10

## 2023-10-06 ASSESSMENT — PAIN SCALES - GENERAL: PAINLEVEL_OUTOF10: 0

## 2023-10-06 NOTE — DISCHARGE SUMMARY
Discharge Summary    Name:  Gabbi Tavares /Age/Sex: 1935  (82 y.o. male)   MRN & CSN:  [id number] & [id number] Admission Date/Time: 10/2/2023 10:28 PM   Attending:  Nishi Montoya MD Discharging Physician: Fernando Weiss, [address] [place] [address] Course:   Gabbi Tavares is a 82 y.o.  male  who presents with Dysphagia    HPI per H&P on admission 10/3/23:  Gabbi Tavares is a 82 y.o. male with diabetes mellitus type 2, CKD stage III, chronic anemia presented to ED for complaints of difficulty swallowing. Patient reports that he has been having difficulty swallowing for few months, which is progressively getting worse. Patient also reports losing 45 pounds in the last 3 months. Patient states that he is actually from [place], was with his daughter and [address] where she was working. They recently moved to [place], [place]. No family is at bedside. ED physician discussed with patient's daughter. Patient denying any chest pain, shortness of breath, denied any abdominal pain, denied any urinary complaints, denied any constipation or diarrhea. Patient reported that he recently had left knee replacement 8 months ago and was getting therapy. But recently since he stopped therapy, and had a couple of falls. Patient usually uses a rollator walker. Chills on presentation-/61, HR 87, RR 14, temp 97.7, saturating 97% on room air. Labs significant for BUN 28, creatinine 1.5, random glucose 183, hemoglobin 9.5, platelets 875    The patient was admitted, GI consulted. Brief problem based POC as noted below:      Dysphagia with esophageal stricture status post dilatation-GI consulted. Status post EGD 10/4/2023 requiring mild dilatation. Repeat EGD today with findings of intrinsic severe stenosis, mucosal changes noting Bazzi's esophagus stage C 9-M11 per [place] criteria, status post biopsies, Hiatal hernia.     -Recommendations noted for outpatient follow-up with GI in 1 week, repeat endoscopy in 2 weeks for retreatment, PPI 40 mg twice daily, full liquid diet x2 days then advance diet as tolerated to soft diet. Continue aspiration precautions. Stable for DC today from GI standpoint and can continue full liquid diet at home. Chronic respiratory failure on home O2 at 2.5 L per nasal cannula - stable on baseline requirement  Diabetes mellitus type 2, insulin-dependent-  -managed on basal and corrective insulin, monitored for adjustment in regimen, hypoglycemic protocol, ADA diet. Acute kidney injury versus CKD stage III-no recent prior renal indices available for review to determine baseline. Creat 1.5 on admission improved to 1.4 x 2 days following. Received IV fluids on admission.  - PVR, UA and urine studies not obtained as ordered. Avoided nephrotoxins IV contrast studies, NSAIDs, BP remained stable with  MAP greater than 65, renal dosed medications for current GFR. Recommend patient f/u with Nephrology/PCP. [facility]. OP BMP ordered. Chronic anemia -hemoglobin 9.5-9.8 during hospital stay, prior hemoglobin not available, iron studies ordered not obtained prior to DC. No active bleeding noted on EGD today. Followed by GI with f/u scheduled in 1 week. Further w/u per GI /PCP OP. OP CBC ordered at [facility]    Chronic debility-patient reports ambulates with walker at home, PT OT evaluated recommendations noted for continued therapies OP. Home care order placed for home PT OT, CM followed and arranged. Notation of atrial fibrillation per previous provider however EKGs reviewed does not show atrial fibrillation-cardiology on board no mention of atrial fibrillation. Patient is currently in sinus rhythm we will continue to monitor on telemetry    The patient clinically progressed and was felt to be in stable condition from medicine and GI team standpoint with f/u care.  The patient expressed appropriate understanding of and agreement with the discharge recommendations, medications, and

## 2023-10-06 NOTE — PROGRESS NOTES
PT ALERT AND ORIENTED X 4. PRE-OP QUESTIONS ASKED AND ANSWERED APPROPRIATELY BY PT. NAME, , ARMBAND VERIFIED, PROCEDURE, ALLERGIES, IMPLANTS, LAST PO INTAKE, HISTORY, MEDS.

## 2023-10-06 NOTE — PROGRESS NOTES
10/06/23 1052   Encounter Summary   Encounter Overview/Reason  Follow-up   Service Provided For: Patient   Referral/Consult From: Other    Support System Children   Last Encounter  10/06/23  (Offered prayer and spiritual support. Patient in good mood because being discharged. No additional visits required.)   Complexity of Encounter Low   Begin Time 1048   End Time  1054   Total Time Calculated 6 min   Spiritual/Emotional needs   Type Spiritual Support   Assessment/Intervention/Outcome   Assessment Hopeful   Intervention Active listening;Empowerment; Discussed belief system/Religion practices/jud;Nurtured Hope;Prayer (assurance of)/[place];Sustaining Presence/Ministry of presence   Outcome Engaged in conversation;Encouraged;Expressed Gratitude;Expressed feelings of Francisca, Peace and/or Love   Plan and Referrals   Plan/Referrals No future visits requested

## 2023-10-06 NOTE — DISCHARGE INSTRUCTIONS
outpatient follow-up with GI in 1 week, repeat endoscopy in 2 weeks for retreatment, PPI 40 mg twice daily-change to oral in am full liquid diet x2 days then advance diet as tolerated to soft diet.   Continue aspiration precautions    RETURN IF SYMPTOMS WORSENT

## 2023-10-06 NOTE — PROGRESS NOTES
Occupational Therapy    Occupational Therapy Treatment Note    Name: Violetta Saint MRN: [id number] :   1935   Date:  10/6/2023   Admission Date: 10/2/2023 Room:  [facility]     Primary Problem: Dysphagia s/p EGD with esophageal dilation    Restrictions/Precautions: General Precautions, Fall Risk, 3L o2, Bed/chair alarm    Communication with other providers: RN    Subjective:  Patient states: \"The doctor said I'm allowed to shower now! \"   Pain: Pt denied pain this date    Objective:    Observation: Pt received in supine upon OT arrival. Pleasant and agreeable to treatment. Objective Measures: A & O x 4    Treatment, including education:  Self Care Training:   Cues were given for safety, sequence, UE/LE placement, visual cues, and balance. Therapeutic Activity Training:   Therapeutic activity training was instructed today. Cues were given for safety, sequence, UE/LE placement, awareness, and balance. Pt received in supine upon OT arrival. Pt re-educated on role of OT, POC, and importance of OOB activity. Pt transferred supine to sitting EOB SBA with HOB elevated to 40' and mod cues for technique/use of bed rail. Pt able to sit at EOB level SBA with good static sitting balance. Pt completed sit to stand transfer from bed CGA with min cues for safe hand placement. Upon standing, pt with mild stool incontinence noted. Pt ambulated household distance to bathroom for toileting CGA with 4ww. Pt required mod cues for safe 4ww mgmt in prep for toilet transfer. Pt transferred stand to sit to toilet CGA with cues for use of grab bar. Pt had additional small bowel movement while seated on toilet, and completed task CGA. Pt required steadying assist for clothing mgmt both directions and able to complete seated najma care without assist. Pt stood from toilet CGA with cues for use of grab bar and ambulated to sink CGA with 4ww. Pt completed hand hygiene in standing at sink SBA with setup.  Following ADLs in bathroom, pt ambulated an additional 150 ft + 150 ft in hallway CGA progressing to SBA with 4ww. One seated rest break required but pt with overall improved activity tolerance this date. Pt returned to room and transferred stand to sit to chair SBA with cues for reaching back with arms. Pt left positioned for comfort in chair with all lines intact, all needs within reach, and chair alarm on. Assessment / Impression:    Patient's tolerance of treatment: Well  Adverse Reaction: None  Significant change in status and impact: Similar presentation as during initial evaluation  Barriers to improvement: Mild intermittent confusion    Plan for Next Session:    Continue per OT POC.      Time in: 1058  Time out: 1122  Timed treatment minutes: 24  Total treatment time: 24    Electronically signed by:    KALEB Abreu/L, [address] [place], [place] [id number]

## 2023-10-06 NOTE — ANESTHESIA POSTPROCEDURE EVALUATION
Department of Anesthesiology  Postprocedure Note    Patient: Luis Harley  MRN: [id number]  YOB: 1935  Date of evaluation: 10/6/2023      Procedure Summary     Date: 10/06/23 Room / Location: 2010 [facility]    Anesthesia Start: 5540 Anesthesia Stop: 4148    Procedure: EGD DILATION BALLOON 8MM-10MM BALLOON DILATED TO 10MM EGD BIOPSY  Diagnosis:       Esophageal dysphagia      (Esophageal dysphagia [R13.19])    Surgeons: Sangeeta Servin MD Responsible Provider: Natali Fonseca MD    Anesthesia Type: MAC ASA Status: 3          Anesthesia Type: MAC    Dionna Phase I: Dionna Score: 10    Dionna Phase II:        Anesthesia Post Evaluation    Patient location during evaluation: PACU  Patient participation: complete - patient participated  Level of consciousness: awake and alert  Airway patency: patent  Nausea & Vomiting: no nausea and no vomiting  Complications: no  Cardiovascular status: hemodynamically stable  Respiratory status: spontaneous ventilation and nasal cannula  Hydration status: stable  Pain management: adequate

## 2023-10-06 NOTE — PLAN OF CARE
Problem: Safety - Adult  Goal: Free from fall injury  Outcome: Completed     Problem: ABCDS Injury Assessment  Goal: Absence of physical injury  Outcome: Completed     Problem: Skin/Tissue Integrity  Goal: Absence of new skin breakdown  Description: 1. Monitor for areas of redness and/or skin breakdown  2. Assess vascular access sites hourly  3. Every 4-6 hours minimum:  Change oxygen saturation probe site  4. Every 4-6 hours:  If on nasal continuous positive airway pressure, respiratory therapy assess nares and determine need for appliance change or resting period.   Outcome: Completed     Problem: Discharge Planning  Goal: Discharge to home or other facility with appropriate resources  Outcome: Completed  Flowsheets (Taken 10/6/2023 1130)  Discharge to home or other facility with appropriate resources: Identify barriers to discharge with patient and caregiver     Problem: Gastrointestinal - Adult  Goal: Maintains adequate nutritional intake  Outcome: Completed  Flowsheets (Taken 10/6/2023 1130)  Maintains adequate nutritional intake: Monitor percentage of each meal consumed     Problem: Nutrition Deficit:  Goal: Optimize nutritional status  Outcome: Completed     Problem: Pain  Goal: Verbalizes/displays adequate comfort level or baseline comfort level  Outcome: Completed

## 2023-10-06 NOTE — PROGRESS NOTES
V2.0  [facility] Hospitalist Progress Note      Name:  Maikel Sloan /Age/Sex: 1935  (82 y.o. male)   MRN & CSN:  [id number] & [id number] Encounter Date/Time: 10/6/2023 10:40 AM EDT    [facility]:  [facility] PCP: No primary care provider on file. Hospital Day: 5    Assessment and Plan:   Maikel Sloan is a 82 y.o. male  who presents with Dysphagia      Dysphagia with esophageal stricture status post dilatation-GI consulted. Status post EGD 10/4/2023 requiring mild dilatation. Repeat EGD today with findings of intrinsic severe stenosis, mucosal changes noting Bazzi's esophagus stage C 9-M11 per [place] criteria, status post biopsies, Hiatal hernia. Recommendations noted for outpatient follow-up with GI in 1 week, repeat endoscopy in 2 weeks for retreatment, PPI 40 mg twice daily-change to oral in am full liquid diet x2 days then advance diet as tolerated to soft diet. Continue aspiration precautions  Chronic respiratory failure on home O2 at 2.5 L per nasal cannula - stable on baseline requirement  Diabetes mellitus type 2, insulin-dependent-  -managed on basal and corrective insulin, monitor for adjustment in regimen, hypoglycemic protocol, ADA diet, A1c in a.m. Acute kidney injury versus CKD stage III-no recent prior renal indices available for review. Creat 1.5 on admission currently 1.4. Received IV fluids on admission.  -Check PVR, UA and urine studies, avoid nephrotoxins IV contrast studies, NSAIDs, keep MAP greater than 65, renal dose medications for current GFR, consider Nephrology consultation  Chronic anemia -hemoglobin 9.5-9.8 during hospital stay, prior hemoglobin not available, will check iron studies in a.m. no active bleeding noted on EGD today  Chronic debility-patient reports ambulates with walker at home, PT OT evaluated.   Home care order placed for home PT OT, CM following    Notation of atrial fibrillation per previous provider however EKGs reviewed does not show atrial

## 2023-10-06 NOTE — PROGRESS NOTES
Physical Therapy  Name: Hakeem Cisneros MRN: [id number] :   1935   Date:  10/6/2023   Admission Date: 10/2/2023 Room:  [address] [place] [address]   Restrictions/Precautions:        General Precautions, Falls     Communication with other providers:  Luna Arellano RN states pt is ok to see for therapy. Rn notified that the patient could like an update on expected discharge and if he can have a shower. Subjective:  Patient states:  \"You've already made friends with me!\" Patient is thankful for receiving PT services today   Pain:   Location, Type, Intensity (0/10 to 10/10): Does not c/o pain    Objective:    Observation:  Patient is seated in recliner upon arrival     Vitals : Patient is on 3L NC O2   HR - 80-90 HR  SpO2 - 93% after ambulation and 95% at EOS    Treatment, including education/measures:    Transfers with line management of NC O2, [place]   Sit to stand :CGA from recliner  Standing balance: SBA with intermittent UE on 4WW  Stand to sit :CGA to recliner. Patient needs cues for BUE effort for eccentric control  SPT: CGA with 4WW, quick pacing and sequence    Gait:  Pt amb with 4WW for 200 ft + 200 ft with CGA assist. He demo's forward flexed posture, step through gait pattern, and decrease step height    Stair training  Patient ascends and descends 4, 6 inch steps x 2 sets at [facility] with x2 UE on hand rail. Cues for step to sequence. Safety  Patient left safely in the recliner, with call light/phone in reach with alarm applied. Gait belt and mask were used for transfers and gait.     Assessment / Impression:     Patient's tolerance of treatment:  Good   Adverse Reaction: none  Significant change in status and impact:  none  Barriers to improvement:  medical status    Plan for Next Session:    Will cont to work towards pt's goals per patient tolerance  Time in:  1407  Time out:  1438  Timed treatment minutes:  31  Total treatment time:  31  Previously filed items:  Social/Functional History  Lives With: Daughter  Type of

## 2023-10-06 NOTE — BRIEF OP NOTE
Brief Postoperative Note      Patient: Glenn Anthony  YOB: 1935  MRN: [id number]    Date of Procedure: 10/6/2023    Pre-Op Diagnosis Codes:     * Esophageal dysphagia [R13.19]    Post-Op Diagnosis:  see below       Procedure(s):  EGD DILATION BALLOON 8MM-10MM BALLOON DILATED TO 10MM EGD BIOPSY     Surgeon(s):  Cole Stephens MD    Assistant:  * No surgical staff found *    Anesthesia: Monitor Anesthesia Care    Estimated Blood Loss (mL): Minimal    Complications: None    Specimens:   ID Type Source Tests Collected by Time Destination   A : FORCEPS Tissue Stomach SURGICAL PATHOLOGY Cole Stephens MD 10/6/2023 8052    B : FORCEPS Tissue Stomach SURGICAL PATHOLOGY Cole Stephens MD 10/6/2023 5909    C : FORCEPS Tissue Stomach SURGICAL PATHOLOGY Cole Stephens MD 10/6/2023 3063    D : FORCEPS Tissue Stomach SURGICAL PATHOLOGY Cole Stephens MD 10/6/2023 4508    E : FORCEPS Tissue Stomach SURGICAL PATHOLOGY Cole Stephens MD 10/6/2023 1810        Implants:  * No implants in log *      Drains: * No LDAs found *    Findings:          -  One benign-appearing, intrinsic severe stenosis was found 24 cm from the             incisors. This stenosis measured 8 mm (inner diameter) x 1 cm (in length). The stenosis was traversed after dilation. A TTS dilator was passed through             the scope. Dilation with an 8-9-10 mm balloon dilator was performed to 10 mm. The dilation site was examined following endoscope reinsertion and showed             moderate mucosal disruption, moderate improvement in luminal narrowing and no             perforation.          -  The esophagus and gastroesophageal junction were examined with white light             and narrow band imaging (NBI). There were esophageal mucosal changes             classified as Bazzi's stage C9-M11 per [place] criteria.   These changes             involved the mucosa at the upper extent of the gastric folds (36 cm from the

## 2023-10-06 NOTE — PLAN OF CARE
Problem: Safety - Adult  Goal: Free from fall injury  Outcome: Progressing     Problem: ABCDS Injury Assessment  Goal: Absence of physical injury  Outcome: Progressing     Problem: Skin/Tissue Integrity  Goal: Absence of new skin breakdown  Description: 1. Monitor for areas of redness and/or skin breakdown  2. Assess vascular access sites hourly  3. Every 4-6 hours minimum:  Change oxygen saturation probe site  4. Every 4-6 hours:  If on nasal continuous positive airway pressure, respiratory therapy assess nares and determine need for appliance change or resting period.   Outcome: Progressing     Problem: Discharge Planning  Goal: Discharge to home or other facility with appropriate resources  Outcome: Progressing     Problem: Gastrointestinal - Adult  Goal: Maintains adequate nutritional intake  Outcome: Progressing     Problem: Nutrition Deficit:  Goal: Optimize nutritional status  Outcome: Progressing

## 2023-10-06 NOTE — CARE COORDINATION
Met c pt to continue discharge planning- pt states he is agreeable to [address] as rec'd by care team.  No pref for provider. Pt does not have an established PCP yet therefore referral sent to [facility] to follow as they can have [facility] follow until pt see's is new PCP. TC to [facility], spoke with Kushal Lancaster, advised pt will need to be billed through [employer] and [facility] to follow, she advised they do not have a contract with [facility] and cannot take pts without a PCP therefore this pt cannot have [address] upon discharge.

## 2023-10-07 LAB
EKG ATRIAL RATE: 86 BPM
EKG DIAGNOSIS: NORMAL
EKG P AXIS: 10 DEGREES
EKG P-R INTERVAL: 160 MS
EKG Q-T INTERVAL: 368 MS
EKG QRS DURATION: 70 MS
EKG QTC CALCULATION (BAZETT): 440 MS
EKG R AXIS: -26 DEGREES
EKG T AXIS: 10 DEGREES
EKG VENTRICULAR RATE: 86 BPM

## 2023-10-07 PROCEDURE — 93010 ELECTROCARDIOGRAM REPORT: CPT | Performed by: INTERNAL MEDICINE

## 2023-10-13 NOTE — RESULT ENCOUNTER NOTE
Patient/Daughter were called to discuss pathology results. Patient was informed that all biopsies taken were negative for dysplasia and malignancy. Per ACG's guidelines on surveillance and treatment he will need lifetime PPI treatment and repeat EGD in 3-5 years based on symptoms. Currently patient is on 40mg Protonix BID. He was advised to continue that dose for now. Per Freida Cherie- op notes patient was requested to return in 2 weeks for repeat EGD for re treatment. Office was notified. He is scheduled 10/26/23. Per office Patient is aware.

## 2023-10-13 NOTE — RESULT ENCOUNTER NOTE
Patient was previously notified of no signs of malignancy or dysplesia in the esophagus and has upcoming egd with Dr. Emilia Calixto on 10/26/23

## 2023-10-25 ENCOUNTER — ANESTHESIA EVENT (OUTPATIENT)
Dept: ENDOSCOPY | Age: 88
End: 2023-10-25
Payer: MEDICARE

## 2023-10-25 NOTE — PROGRESS NOTES
Left message for patient to arrive at 0800 at [facility] on 10/26/2023 for his procedure at 0930. Orders are in epic.

## 2023-10-26 ENCOUNTER — ANESTHESIA (OUTPATIENT)
Dept: ENDOSCOPY | Age: 88
End: 2023-10-26
Payer: MEDICARE

## 2023-10-26 ENCOUNTER — HOSPITAL ENCOUNTER (OUTPATIENT)
Age: 88
Setting detail: OUTPATIENT SURGERY
Discharge: HOME | End: 2023-10-26
Attending: INTERNAL MEDICINE | Admitting: INTERNAL MEDICINE
Payer: MEDICARE

## 2023-10-26 VITALS
SYSTOLIC BLOOD PRESSURE: 131 MMHG | RESPIRATION RATE: 16 BRPM | HEART RATE: 56 BPM | WEIGHT: 180 LBS | DIASTOLIC BLOOD PRESSURE: 55 MMHG | TEMPERATURE: 97 F | BODY MASS INDEX: 27.28 KG/M2 | OXYGEN SATURATION: 94 % | HEIGHT: 68 IN

## 2023-10-26 DIAGNOSIS — K22.2 OBSTRUCTION, ESOPHAGUS: ICD-10-CM

## 2023-10-26 DIAGNOSIS — R13.19 ESOPHAGEAL DYSPHAGIA: ICD-10-CM

## 2023-10-26 LAB — GLUCOSE BLD-MCNC: 128 MG/DL (ref 70–99)

## 2023-10-26 PROCEDURE — 3700000000 HC ANESTHESIA ATTENDED CARE: Performed by: INTERNAL MEDICINE

## 2023-10-26 PROCEDURE — 88342 IMHCHEM/IMCYTCHM 1ST ANTB: CPT | Performed by: PATHOLOGY

## 2023-10-26 PROCEDURE — 3609017700 HC EGD DILATION GASTRIC/DUODENAL STRICTURE: Performed by: INTERNAL MEDICINE

## 2023-10-26 PROCEDURE — 2709999900 HC NON-CHARGEABLE SUPPLY: Performed by: INTERNAL MEDICINE

## 2023-10-26 PROCEDURE — C1726 CATH, BAL DIL, NON-VASCULAR: HCPCS | Performed by: INTERNAL MEDICINE

## 2023-10-26 PROCEDURE — 82962 GLUCOSE BLOOD TEST: CPT

## 2023-10-26 PROCEDURE — 2580000003 HC RX 258: Performed by: ANESTHESIOLOGY

## 2023-10-26 PROCEDURE — 43239 EGD BIOPSY SINGLE/MULTIPLE: CPT | Performed by: INTERNAL MEDICINE

## 2023-10-26 PROCEDURE — 7100000010 HC PHASE II RECOVERY - FIRST 15 MIN: Performed by: INTERNAL MEDICINE

## 2023-10-26 PROCEDURE — 7100000011 HC PHASE II RECOVERY - ADDTL 15 MIN: Performed by: INTERNAL MEDICINE

## 2023-10-26 PROCEDURE — 3700000001 HC ADD 15 MINUTES (ANESTHESIA): Performed by: INTERNAL MEDICINE

## 2023-10-26 PROCEDURE — 88305 TISSUE EXAM BY PATHOLOGIST: CPT | Performed by: PATHOLOGY

## 2023-10-26 PROCEDURE — 43249 ESOPH EGD DILATION <30 MM: CPT | Performed by: INTERNAL MEDICINE

## 2023-10-26 PROCEDURE — 6360000002 HC RX W HCPCS

## 2023-10-26 RX ORDER — LIDOCAINE HYDROCHLORIDE 20 MG/ML
INJECTION, SOLUTION INTRAVENOUS PRN
Status: DISCONTINUED | OUTPATIENT
Start: 2023-10-26 | End: 2023-10-26 | Stop reason: SDUPTHER

## 2023-10-26 RX ORDER — PROPOFOL 10 MG/ML
INJECTION, EMULSION INTRAVENOUS PRN
Status: DISCONTINUED | OUTPATIENT
Start: 2023-10-26 | End: 2023-10-26 | Stop reason: SDUPTHER

## 2023-10-26 RX ORDER — SODIUM CHLORIDE, SODIUM LACTATE, POTASSIUM CHLORIDE, CALCIUM CHLORIDE 600; 310; 30; 20 MG/100ML; MG/100ML; MG/100ML; MG/100ML
INJECTION, SOLUTION INTRAVENOUS CONTINUOUS
Status: DISCONTINUED | OUTPATIENT
Start: 2023-10-26 | End: 2023-10-26 | Stop reason: HOSPADM

## 2023-10-26 RX ADMIN — SODIUM CHLORIDE, POTASSIUM CHLORIDE, SODIUM LACTATE AND CALCIUM CHLORIDE: 600; 310; 30; 20 INJECTION, SOLUTION INTRAVENOUS at 08:31

## 2023-10-26 RX ADMIN — PROPOFOL 200 MG: 10 INJECTION, EMULSION INTRAVENOUS at 09:53

## 2023-10-26 RX ADMIN — LIDOCAINE HYDROCHLORIDE 100 MG: 20 INJECTION, SOLUTION INTRAVENOUS at 09:53

## 2023-10-26 ASSESSMENT — PAIN - FUNCTIONAL ASSESSMENT: PAIN_FUNCTIONAL_ASSESSMENT: 0-10

## 2023-10-26 ASSESSMENT — PAIN SCALES - GENERAL
PAINLEVEL_OUTOF10: 0
PAINLEVEL_OUTOF10: 0

## 2023-10-26 NOTE — PROGRESS NOTES
Pt received from endo @1027, report taken from [place], [address]. Pt. Given Diet Pepsi, daughter at bedside, no c/o, call light within reach. 1045 pt has no c/o, ready to get dressed, daughter at b/s, call light in reach. 1110 iv removed, pt getting dressed, daughter at b/s, call light in reach. 1120 d/c instructions given to pt and daughter and all questions were answered. Daughter to get car.  [phone] pt d/c to home via w/c per vips.

## 2023-10-26 NOTE — ANESTHESIA POSTPROCEDURE EVALUATION
Department of Anesthesiology  Postprocedure Note    Patient: Columba Reza  MRN: [id number]  YOB: 1935  Date of evaluation: 10/26/2023      Procedure Summary     Date: 10/26/23 Room / Location: 2010 [facility] / [facility]    Anesthesia Start: 6054 Anesthesia Stop:     Procedure: EGD DILATION BALLOON UP TO 8MM-10MM WITH DILATION TO 10MM AND BIOPSIES TAKEN FOR H-PYLORI Diagnosis:       Esophageal dysphagia      Obstruction, esophagus      (Esophageal dysphagia [R13.19])      (Obstruction, esophagus [K22.2])    Surgeons: David Pina MD Responsible Provider: SUDARSHAN Malave CRNA    Anesthesia Type: MAC ASA Status: 3          Anesthesia Type: MAC    Dionna Phase I: Dionna Score: 10    Dionna Phase II:        Anesthesia Post Evaluation    Patient location during evaluation: bedside  Patient participation: complete - patient participated  Level of consciousness: awake  Pain score: 0  Airway patency: patent  Nausea & Vomiting: no vomiting and no nausea  Complications: no  Cardiovascular status: hemodynamically stable  Respiratory status: acceptable, airway suctioned, spontaneous ventilation and room air  Hydration status: stable  Pain management: adequate

## 2023-10-26 NOTE — DISCHARGE INSTRUCTIONS
[facility]  [phone]    Do not drive, work around [address] [facility] or use equipment. Do not drink any alcoholic beverages. Do not smoke while alone. Avoid making important decisions. Plan to spend a quiet, relaxed evening @ home. Resume normal activities as you begin to feel better. Eat lightly for your first meal, then gradually increase your diet to what is normal for you. In case of nausea, avoid food and drink only clear liquids. Resume food as nausea ceases. Notify your surgeon if you experience fever, chills, large amount of bleeding, difficulty breathing, persistent nausea and vomiting or any other disturbing problem. Call for a follow-up appointment with your surgeon. EGD    DR. Conde    FOLLOW UP APPOINTMENT IN 1 WEEKS for test results. REPEAT PROCEDURE IN 2 weeks. TEST ORDERED: NONE. What to Expect at [address] [facility] Recovery:EGD  The only discomfort after your EGD is generally limited to a mild soreness of the throat, which may last a day or two. Call your physician immediately if you have severe chest pain, shortness of breath or a temperature of 100 degrees or higher if taken orally. How can you care for yourself at home? Activity  Rest as much as you need to after you go home. You should be able to go back to your usual activities the day after the test.  Diet  Follow your doctor's directions for eating after the test.  Drink plenty of fluids (unless your doctor has told you not to). Medications  If you have a sore throat the day after the test, use an over-the-counter spray to numb your throat. Your doctor will tell you if and when you can restart your medicines. He or she will also give you instructions about taking any new medicines. If you take blood thinners, such as warfarin (Coumadin), clopidogrel (Plavix), or aspirin, be sure to talk to your doctor. He or she will tell you if and when to start taking those medicines again.  Make sure that you instruction is for use with your licensed healthcare professional. If you have questions about a medical condition or this instruction, always ask your healthcare professional. [address] any warranty or liability for your use of this information.   Content Version: 89.1.923803; Current as of: November 20, 2015

## (undated) DEVICE — SYRINGE INFL 60ML DISP ALLIANCE II

## (undated) DEVICE — ENDOSCOPY KIT: Brand: MEDLINE INDUSTRIES, INC.

## (undated) DEVICE — ESOPHAGEAL/PYLORIC/COLONIC/BILIARY WIREGUIDED BALLOON DILATATION CATHETER: Brand: CRE™ PRO

## (undated) DEVICE — HERCULES 3 STAGE BALLOON ESOPHAGEAL: Brand: HERCULES

## (undated) DEVICE — ESOPHAGEAL BALLOON DILATATION CATHETER: Brand: CRE FIXED WIRE

## (undated) DEVICE — FORCEPS BX L240CM JAW DIA2.8MM L CAP W/ NDL MIC MESH TOOTH